# Patient Record
Sex: FEMALE | Race: BLACK OR AFRICAN AMERICAN | NOT HISPANIC OR LATINO | Employment: FULL TIME | URBAN - METROPOLITAN AREA
[De-identification: names, ages, dates, MRNs, and addresses within clinical notes are randomized per-mention and may not be internally consistent; named-entity substitution may affect disease eponyms.]

---

## 2023-09-30 ENCOUNTER — HOSPITAL ENCOUNTER (EMERGENCY)
Facility: HOSPITAL | Age: 48
Discharge: HOME | End: 2023-09-30
Attending: EMERGENCY MEDICINE

## 2023-09-30 VITALS
HEART RATE: 79 BPM | BODY MASS INDEX: 25.4 KG/M2 | OXYGEN SATURATION: 97 % | WEIGHT: 148 LBS | TEMPERATURE: 98 F | SYSTOLIC BLOOD PRESSURE: 119 MMHG | RESPIRATION RATE: 18 BRPM | DIASTOLIC BLOOD PRESSURE: 78 MMHG

## 2023-09-30 DIAGNOSIS — S62.639B OPEN FRACTURE OF TUFT OF DISTAL PHALANX OF FINGER: ICD-10-CM

## 2023-09-30 DIAGNOSIS — S68.626A PARTIAL TRAUMATIC AMPUTATION OF RIGHT LITTLE FINGER THROUGH PHALANX, INITIAL ENCOUNTER: Primary | ICD-10-CM

## 2023-09-30 PROCEDURE — 99284 EMERGENCY DEPT VISIT MOD MDM: CPT | Mod: 25

## 2023-09-30 PROCEDURE — 12001 RPR S/N/AX/GEN/TRNK 2.5CM/<: CPT

## 2023-09-30 PROCEDURE — 96374 THER/PROPH/DIAG INJ IV PUSH: CPT

## 2023-09-30 PROCEDURE — 63600175 PHARM REV CODE 636 W HCPCS

## 2023-09-30 RX ORDER — OXYCODONE AND ACETAMINOPHEN 5; 325 MG/1; MG/1
1 TABLET ORAL EVERY 6 HOURS PRN
Qty: 12 TABLET | Refills: 0 | Status: SHIPPED | OUTPATIENT
Start: 2023-09-30 | End: 2023-10-03

## 2023-09-30 RX ORDER — IBUPROFEN 600 MG/1
600 TABLET ORAL EVERY 6 HOURS PRN
Qty: 20 TABLET | Refills: 0 | Status: SHIPPED | OUTPATIENT
Start: 2023-09-30

## 2023-09-30 RX ORDER — SULFAMETHOXAZOLE AND TRIMETHOPRIM 800; 160 MG/1; MG/1
1 TABLET ORAL 2 TIMES DAILY
Qty: 14 TABLET | Refills: 0 | Status: SHIPPED | OUTPATIENT
Start: 2023-09-30 | End: 2023-10-07

## 2023-09-30 RX ORDER — MORPHINE SULFATE 2 MG/ML
6 INJECTION, SOLUTION INTRAMUSCULAR; INTRAVENOUS
Status: COMPLETED | OUTPATIENT
Start: 2023-09-30 | End: 2023-09-30

## 2023-09-30 RX ADMIN — MORPHINE SULFATE 6 MG: 2 INJECTION, SOLUTION INTRAMUSCULAR; INTRAVENOUS at 08:09

## 2023-09-30 NOTE — Clinical Note
"Mireya Mcmullencarey Pugh was seen and treated in our emergency department on 9/30/2023.  She may return to work on 10/05/2023.       If you have any questions or concerns, please don't hesitate to call.      Fausto Harrington, DO"

## 2023-09-30 NOTE — CONSULTS
Orthopedic Surgery  Consult Note    Mireya Pugh  09/30/2023    CC: Right little finger tuft fracture    HPI: Mireya Pugh is a right hand dominant 48 y.o. female with no significant pmhx who presents with a tuft fracture of the right little finger after doing yard work yesterday evening. 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 Denies head injury or LOC. Denies prior injuries or orthopedic surgeries. Denies use of blood thinners. Denies other MSK pains or paresthesias.       No past medical history on file.  No past surgical history on file.  No family history on file.  Social History     Socioeconomic History    Marital status: Single   Tobacco Use    Smoking status: Never   Substance and Sexual Activity    Alcohol use: No     Current Facility-Administered Medications on File Prior to Encounter   Medication    [COMPLETED] ceFAZolin 2 g in dextrose 5 % in water (D5W) 50 mL IVPB (MB+)    [COMPLETED] HYDROmorphone injection 1 mg    [COMPLETED] HYDROmorphone injection 1 mg    [COMPLETED] HYDROmorphone injection 1 mg    [COMPLETED] lactated ringers bolus 1,000 mL    [COMPLETED] ondansetron injection 4 mg     Current Outpatient Medications on File Prior to Encounter   Medication Sig    diclofenac sodium (VOLTAREN) 1 % Gel Apply 2 g topically 4 (four) times daily.    ketorolac (TORADOL) 10 mg tablet Take 1 tablet (10 mg total) by mouth every 6 (six) hours.         Review of Systems:  Constitutional: Denies fever/chills  Neurological: Denies numbness/tingling (any exceptions noted in orthopaedic exam)   Psychiatric/Behavioral: Denies change in normal mood  Eyes: Denies change in vision  Cardiovascular: Denies chest pain  Respiratory: Denies shortness of breath  Hematologic/Lymphatic: Denies easy bleeding/bruising   Skin: Denies new rash or skin lesions   Gastrointestinal: Denies nausea/vomitting/diarrhea, change in bowel habits, abdominal pain  "  Allergic/Immunologic: Denies adverse reactions to current medications  Musculoskeletal: see HPI      Physical Exam:    Temp:  [98.1 °F (36.7 °C)-98.7 °F (37.1 °C)] 98.1 °F (36.7 °C)  Pulse:  [71-86] 79  Resp:  [12-20] 18  SpO2:  [94 %-99 %] 97 %  BP: (102-128)/(61-86) 119/78    Vitals: Afebrile.  Vital signs stable.  General: No acute distress.  HEENT: Normocephalic. Atraumatic. Sclera anicteric. No tracheal deviation.  Cardio: Regular rate.  Chest: No increased work of breathing.  Abdominal: Nondistended.  Extremities: No cyanosis.  No clubbing.    Skin: No generalized rash.  Neuro: Awake. Alert. Oriented to person, place, time, and situation.  Psych: Normal appearance. Cooperative.  Appropriate mood.  Appropriate affect.       MSK:  Left Upper Extremity  Inspection  - Skin intact throughout, no open wounds  - No swelling  - No ecchymosis, erythema, or signs of cellulitis  Palpation  - NonTTP throughout, no palpable abnormality   Range of motion  - AROM and PROM of the shoulder, elbow, wrist, and hand intact  Stability  - No evidence of joint dislocation or abnormal laxity   Neurovascular  - AIN/PIN/Radial/Median/Ulnar Nerves assessed in isolation without deficit  - Able to give thumbs up, make "OK" sign, cross IF/LF, abduct/adduct fingers, make fist  - SILT throughout  - Compartments soft  - Radial artery palpated  - Capillary Refill <3s  - Muscle tone normal    Right Upper Extremity  Inspection  - Crush injury to the dorsum of right distal phalanx of the little finger, nail completely removed as well as nail bed and skin violated proximal to the region of eponychial fold.  - Exposed bone without adequate soft tissue coverage.   Palpation  - TTP over distal little finger  Range of motion  - AROM and PROM of the shoulder, elbow, wrist, hand, and fingers intact  Stability  - No evidence of joint dislocation or abnormal laxity  Neurovascular  - AIN/PIN/Radial/Median/Ulnar Nerves assessed in isolation without " "deficit  - Able to give thumbs up, make "OK" sign, cross IF/LF, abduct/adduct fingers, make fist  - SILT throughout  - Compartments soft  - Radial artery palpated  - Capillary Refill <3s  - Muscle tone normal    Diagnostic Results: XR of the Right hand show tuft fracture to distal phalanx of little finger    Assessment/Plan:  Mireya Pugh is a 48 y.o. female with an open tuft fracture to right distal phalanx of the little finger. Patient received ancef at 2318 yesterday when she initially presented to ED. See procedure note for more details.     - Procedure Note: Tuft fracture repair  Patient was explained risks, benefits, and alternatives to treatment and verbalized consent to proceed. Patient and location was confirmed.  10 cc of 1% lidocaine was injected for a digital block after local cleaning with alcohol swabs/betadine/CHG. Distal extremity was cleansed with betadine and draped with blue towels.Wound was irrigated with 2 L of normal saline. Soft tissue was brought together with 3 3-0 plain gut sutures in a simple interrupted pattern. Wound was dressed with soft dressing of xeroform, 4x4, cast padding, ACE bandage. No complications were noted.     - Pt will need revision amputation likely or operative soft tissue closure of this fracture.  - Due to self pay status of this patient at this time, she has chosen to follow up at [facility]. Discharge paperwork to include instructions.      Jin Bland MD  Orthopedic Surgery Resident  09/30/2023      "

## 2023-09-30 NOTE — PLAN OF CARE
CARLI faxed referral to [facility] [phone]      Tena Harrison CD, MSW, LMSW, RSW   Case Management  [facility]  Email: [email or web address]

## 2023-09-30 NOTE — DISCHARGE INSTRUCTIONS
If you would like to follow up with the [facility] for further care of your fracture, please call the [facility] Scheduling Department at [phone] during business hours.  Please let the  know you need a fracture follow-up appointment with Orthopedics, and you will be scheduled in the Orthopedic Clinic.  Please bring your original Emergency Department discharge papers with  you to the clinic appointment.    You have a traumatic amputation of your finger tip along with a small fracture.  You will need follow-up with hand clinic at [facility].  Please call the phone number above to obtain a follow-up.

## 2023-09-30 NOTE — ED PROVIDER NOTES
Encounter Date: 9/30/2023       History     Chief Complaint   Patient presents with    Transfer     Finger Injury     R 5th digit partial amputation from [facility]     Mireya Pugh is a 48-year-old female who is a transfer to [facility] main ED for a crush injury to her right 5th distal finger.  Patient's past medical history is noncontributory.  As per provider note and attestation from patient she states that she sustained this injury from a water heater falling on her finger when trying to install it in the house. Patient presents with stable vital signs in nonacute distress but endorses exquisite pain that is 10/10 in her hand.  Preliminary workup at transfer facility, as per prior ED provider note included x-ray that demonstrated crush injury of soft tissue with associated distal phalangeal fracture of the right 5th finger, with no other injuries of the hand.  Tetanus vaccine was given. Wound irrigated and 2 g Ancef IV administered.  1 L LR bolus.  2 mg Dilaudid given for pain.  4 mg Zofran for nausea.  Patient is not on blood thinners or any other medications.  Patient denies headache, fever, nausea, abdominal pain, motor or sensory loss.     The history is provided by the patient and the spouse. No  was used.     Review of patient's allergies indicates:  No Known Allergies  No past medical history on file.  No past surgical history on file.  No family history on file.  Social History     Tobacco Use    Smoking status: Never   Substance Use Topics    Alcohol use: No     Review of Systems   All other systems reviewed and are negative.      Physical Exam     Initial Vitals [09/30/23 0523]   BP Pulse Resp Temp SpO2   116/82 77 18 98.3 °F (36.8 °C) 96 %      MAP       --         Physical Exam    Nursing note and vitals reviewed.  Constitutional: She appears well-developed and well-nourished.   HENT:   Head: Normocephalic and atraumatic.   Eyes: Conjunctivae and EOM are normal. Pupils are equal,  round, and reactive to light.   Neck: Neck supple.   Normal range of motion.  Cardiovascular:  Normal rate, regular rhythm, normal heart sounds and intact distal pulses.           Pulmonary/Chest: Breath sounds normal.   Musculoskeletal:         General: Normal range of motion.      Cervical back: Normal range of motion and neck supple.      Comments: Crush injury appreciated on right distal 5th phalange.  Hemostasis controlled with bandage.     Neurological: She is alert and oriented to person, place, and time. She has normal strength. GCS score is 15. GCS eye subscore is 4. GCS verbal subscore is 5. GCS motor subscore is 6.   Skin: Skin is warm and dry. Capillary refill takes less than 2 seconds.   Psychiatric: She has a normal mood and affect. Her behavior is normal. Judgment and thought content normal.         ED Course   Lac Repair    Date/Time: 9/30/2023 11:00 AM    Performed by: Jin Bland MD  Authorized by: Fausto Harrington DO    Consent:     Consent obtained:  Verbal    Consent given by:  Patient    Risks, benefits, and alternatives were discussed: yes      Risks discussed:  Infection, pain, need for additional repair and poor cosmetic result  Universal protocol:     Procedure explained and questions answered to patient or proxy's satisfaction: yes      Patient identity confirmed:  Verbally with patient, arm band, provided demographic data and hospital-assigned identification number  Anesthesia:     Anesthesia method:  Nerve block    Block location:  Digital block    Block needle gauge:  25 G    Block anesthetic:  Lidocaine 1% w/o epi    Block technique:  Digital block    Block injection procedure:  Anatomic landmarks identified, introduced needle, anatomic landmarks palpated and negative aspiration for blood    Block outcome:  Anesthesia achieved  Laceration details:     Location:  Finger    Finger location:  R small finger  Pre-procedure details:     Preparation:  Patient was prepped and draped  in usual sterile fashion and imaging obtained to evaluate for foreign bodies  Exploration:     Limited defect created (wound extended): no      Hemostasis achieved with:  Direct pressure    Wound exploration: wound explored through full range of motion and entire depth of wound visualized      Wound extent: areolar tissue violated, fascia violated and underlying fracture      Contaminated: no    Treatment:     Area cleansed with:  Saline    Amount of cleaning:  Extensive    Irrigation solution:  Sterile saline    Irrigation volume:  2000    Irrigation method:  Pressure wash and syringe    Visualized foreign bodies/material removed: no    Skin repair:     Repair method:  Sutures    Suture size:  3-0    Suture material:  Plain gut    Suture technique:  Simple interrupted  Approximation:     Approximation:  Close  Repair type:     Repair type:  Complex  Post-procedure details:     Dressing:  Sterile dressing, bulky dressing and non-adherent dressing    Procedure completion:  Tolerated    Labs Reviewed - No data to display       Imaging Results    None          Medications   morphine injection 6 mg (6 mg Intravenous Given 9/30/23 0852)     Medical Decision Making  Patient is a 48-year-old female presenting to [facility] ED as a transfer from neighboring ED for evaluation of crush injury sustained from the water heater tank impacting her right 5th finger distally.  X-ray taken at initial ED demonstrated fracture of distal phalanges of the 5th finger on right hand with prominent bleeding and circumferential laceration.  Pain controlled with morphine 6 mg.  Orthopedic surgery is aware of her case and plans to see her in ED. patient was given antibiotics and Zofran for nausea.    Plan:   -Ortho plans to washout and close wound at bedside.   -D/C with follow up out patient to orthopedics clinic at [facility].  -Bactrim and Percocet prescription given    Patient's questions answered in fall to her satisfaction.  She displayed good  understanding of her injury and agrees with treatment plan.  Moving forward.  Patient was given strict return precautions if symptoms worsen, infection arises or wound dehiscence are to emerge.    Problems Addressed:  Open fracture of tuft of distal phalanx of finger: acute illness or injury     Details: Evaluated by Orthopedic surgery, with plan for washout and wound closure at bedside    Amount and/or Complexity of Data Reviewed  Independent Historian: EMS     Details: Transfer from outside hospital for evaluation of tuft fracture with partial amputation of  External Data Reviewed: radiology and notes.     Details: Prior ED note was reviewed to assess treatment actions already taken - 9/30/23:  Patient initially presented as a transfer from outside hospital for evaluation of partial amputation with tuft fracture  Radiology: independent interpretation performed. Decision-making details documented in ED Course.     Details: Distal phalanges fracture of 5th finger on right hand  Discussion of management or test interpretation with external provider(s): I discussed this case with Orthopedic surgery who agreed to see her.  They are recommendations are to see her in outpatient clinic at [facility] for further treatment.    Risk  OTC drugs.  Prescription drug management.  Parenteral controlled substances.  Minor surgery with no identified risk factors.  Risk Details: Will likely need revision of her partial amputation and outpatient clinic              Attending Attestation:   Physician Attestation Statement for Resident:  As the supervising MD   Physician Attestation Statement: I have personally seen and examined this patient.   I agree with the above history.  -:   As the supervising MD I agree with the above PE.     As the supervising MD I agree with the above treatment, course, plan, and disposition.                          I have reviewed and concur with the resident's history, physical, assessment, and plan.  I have  personally interviewed and examined the patient at bedside.   I did supervise any and all procedures and was present for any critical portion, and was always immediately available for help and as a resource.     The above history physical, review of symptoms, HPI and physical exam reflect my independent interpretation and evaluation.    Briefly, patient transferred for traumatic partial amputation of her right hand 5th digit finger tip with underlying drops fracture.  She received tetanus booster and antibiotics prior to arrival.  On further evaluation, I reviewed her imaging, evaluated orthopedic surgery at bedside.  They performed under my supervision a laceration repair and dressing placement.  Patient discharged with follow-up.  See below for recommendations and Orthopedic intervention.    Patient was explained risks, benefits, and alternatives to treatment and verbalized consent to proceed. Patient and location was confirmed.  10 cc of 1% lidocaine was injected for a digital block after local cleaning with alcohol swabs/betadine/CHG. Distal extremity was cleansed with betadine and draped with blue towels.Wound was irrigated with 2 L of normal saline. Soft tissue was brought together with 3 3-0 plain gut sutures in a simple interrupted pattern. Wound was dressed with soft dressing of xeroform, 4x4, cast padding, ACE bandage. No complications were noted.      - Pt will need revision amputation likely or operative soft tissue closure of this fracture.  - Due to self pay status of this patient at this time, she has chosen to follow up at [facility]. Discharge paperwork to include instructions.    Complexity: High Risk    Final diagnoses:  [K46.662C] Partial traumatic amputation of right little finger through phalanx, initial encounter (Primary)  [O68.802J] Open fracture of tuft of distal phalanx of finger     Fausto Harrington DO, FAAEM  Emergency Staff Physician   Dept of Emergency Medicine   [facility]: [id number]        Disclaimer: This note has been generated using voice-recognition software. There may be typographical errors that have been missed during proof-reading.                        Clinical Impression:     1. Partial traumatic amputation of right little finger through phalanx, initial encounter    2. Open fracture of tuft of distal phalanx of finger           ED Disposition Condition    Discharge Stable          ED Prescriptions       Medication Sig Dispense Start Date End Date Auth. Provider    sulfamethoxazole-trimethoprim 800-160mg (BACTRIM DS) 800-160 mg Tab Take 1 tablet by mouth 2 (two) times daily. for 7 days 14 tablet 9/30/2023 10/7/2023 Fausto Harrington DO    ibuprofen (ADVIL,MOTRIN) 600 MG tablet Take 1 tablet (600 mg total) by mouth every 6 (six) hours as needed for Pain. 20 tablet 9/30/2023 -- Fausto Harrington DO    oxyCODONE-acetaminophen (PERCOCET) 5-325 mg per tablet Take 1 tablet by mouth every 6 (six) hours as needed for Pain. 12 tablet 9/30/2023 10/3/2023 Fausto Harrington DO          Follow-up Information       Follow up With Specialties Details Why Contact [facility] Surgical Oncology, Orthopedic Surgery, Genetics, Physical Medicine and Rehabilitation, Occupational Therapy, Radiology Schedule an appointment as soon as possible for a visit in 1 week  [address] [facility] [address]  [phone]      [facility] Surgical Oncology, Orthopedic Surgery, Genetics, Physical Medicine and Rehabilitation, Occupational Therapy, Radiology   [address] [facility] [address]  [phone]               Estrada Holder MD  Resident  09/30/23 1614       Fausto Harrington DO  10/01/23 1036

## 2023-09-30 NOTE — ED TRIAGE NOTES
Transfer from [facility]. Pt states around 22:00 last night she had her R 5th digit caught between a water heater and the wall. Bleeding controlled on transfer. Pt states 8/10 pain, but states she doesn't want pain meds at the moment due to receiving dilaudid recently. Pt's finger currently wrapped. + 2 right radial pulse.